# Patient Record
Sex: FEMALE | Race: WHITE | NOT HISPANIC OR LATINO | URBAN - METROPOLITAN AREA
[De-identification: names, ages, dates, MRNs, and addresses within clinical notes are randomized per-mention and may not be internally consistent; named-entity substitution may affect disease eponyms.]

---

## 2022-07-15 ENCOUNTER — OFFICE VISIT (OUTPATIENT)
Dept: URBAN - METROPOLITAN AREA CLINIC 46 | Facility: CLINIC | Age: 27
End: 2022-07-15
Payer: COMMERCIAL

## 2022-07-15 ENCOUNTER — TELEPHONE ENCOUNTER (OUTPATIENT)
Dept: URBAN - METROPOLITAN AREA CLINIC 46 | Facility: CLINIC | Age: 27
End: 2022-07-15

## 2022-07-15 VITALS
TEMPERATURE: 98 F | WEIGHT: 155.4 LBS | BODY MASS INDEX: 27.54 KG/M2 | SYSTOLIC BLOOD PRESSURE: 109 MMHG | DIASTOLIC BLOOD PRESSURE: 61 MMHG | HEIGHT: 63 IN | HEART RATE: 90 BPM

## 2022-07-15 DIAGNOSIS — K59.01 SLOW TRANSIT CONSTIPATION: ICD-10-CM

## 2022-07-15 DIAGNOSIS — R14.0 BLOATING: ICD-10-CM

## 2022-07-15 PROCEDURE — 99204 OFFICE O/P NEW MOD 45 MIN: CPT | Performed by: INTERNAL MEDICINE

## 2022-07-15 RX ORDER — PRUCALOPRIDE 2 MG/1
1 TABLET TABLET, FILM COATED ORAL ONCE A DAY
Qty: 30 | Refills: 3 | OUTPATIENT
Start: 2022-07-15 | End: 2022-11-11

## 2022-07-15 RX ORDER — METHYLDOPA/HYDROCHLOROTHIAZIDE 250MG-15MG
AS DIRECTED TABLET ORAL
Status: ACTIVE | COMMUNITY

## 2022-07-15 RX ORDER — PLECANATIDE 3 MG/1
1 TABLET TABLET ORAL ONCE A DAY
Qty: 30 | Refills: 3 | OUTPATIENT
Start: 2022-07-15 | End: 2022-11-11

## 2022-07-15 NOTE — HPI-TODAY'S VISIT:
Pt presents with 1 year of progressive post prandial bloating. constipation, upper abdominal pain. BM every 1-2 days but hard. Trial of probiotics and no relief.  Bloating will all food types. Cramping mild and a few times a week.  States no issues in adolescents. No associated weight loss or gain. No change with diet modification to gluten free. Denies blood in the stool. No family hx of CRC or IBD. In past trial of Linzess but too strong.

## 2022-07-29 ENCOUNTER — TELEPHONE ENCOUNTER (OUTPATIENT)
Dept: URBAN - METROPOLITAN AREA CLINIC 46 | Facility: CLINIC | Age: 27
End: 2022-07-29

## 2022-08-22 ENCOUNTER — TELEPHONE ENCOUNTER (OUTPATIENT)
Dept: URBAN - METROPOLITAN AREA CLINIC 46 | Facility: CLINIC | Age: 27
End: 2022-08-22

## 2022-09-13 ENCOUNTER — LAB OUTSIDE AN ENCOUNTER (OUTPATIENT)
Dept: URBAN - METROPOLITAN AREA CLINIC 44 | Facility: CLINIC | Age: 27
End: 2022-09-13

## 2022-09-14 ENCOUNTER — DASHBOARD ENCOUNTERS (OUTPATIENT)
Age: 27
End: 2022-09-14

## 2022-09-14 LAB
A/G RATIO: 2.2
ABSOLUTE BASOPHILS: 38
ABSOLUTE EOSINOPHILS: 143
ABSOLUTE LYMPHOCYTES: 1875
ABSOLUTE MONOCYTES: 510
ABSOLUTE NEUTROPHILS: 4935
ALBUMIN: 4.6
ALKALINE PHOSPHATASE: 36
ALT (SGPT): 17
AST (SGOT): 14
BASOPHILS: 0.5
BILIRUBIN, TOTAL: 0.5
BUN/CREATININE RATIO: (no result)
BUN: 13
CALCIUM: 9.3
CARBON DIOXIDE, TOTAL: 23
CHLORIDE: 107
CREATININE: 0.67
EGFR: 124
EOSINOPHILS: 1.9
GLOBULIN, TOTAL: 2.1
GLUCOSE: 82
HEMATOCRIT: 39.5
HEMOGLOBIN: 13.2
IMMUNOGLOBULIN A, QN, SERUM: 104
INTERPRETATION: (no result)
LYMPHOCYTES: 25
MCH: 31.4
MCHC: 33.4
MCV: 94
MONOCYTES: 6.8
MPV: 9.1
NEUTROPHILS: 65.8
PLATELET COUNT: 276
POTASSIUM: 4.2
PROTEIN, TOTAL: 6.7
RDW: 11.4
RED BLOOD CELL COUNT: 4.2
SODIUM: 140
T-TRANSGLUTAMINASE (TTG) IGA: <1
TSH W/REFLEX TO FT4: 0.58
WHITE BLOOD CELL COUNT: 7.5

## 2022-09-15 ENCOUNTER — OFFICE VISIT (OUTPATIENT)
Dept: URBAN - METROPOLITAN AREA CLINIC 46 | Facility: CLINIC | Age: 27
End: 2022-09-15
Payer: COMMERCIAL

## 2022-09-15 VITALS
HEIGHT: 63 IN | BODY MASS INDEX: 28.1 KG/M2 | DIASTOLIC BLOOD PRESSURE: 56 MMHG | HEART RATE: 89 BPM | TEMPERATURE: 98.5 F | WEIGHT: 158.6 LBS | SYSTOLIC BLOOD PRESSURE: 115 MMHG

## 2022-09-15 DIAGNOSIS — R14.0 BLOATING: ICD-10-CM

## 2022-09-15 DIAGNOSIS — K59.01 SLOW TRANSIT CONSTIPATION: ICD-10-CM

## 2022-09-15 PROBLEM — 35298007: Status: ACTIVE | Noted: 2022-07-15

## 2022-09-15 PROCEDURE — 99213 OFFICE O/P EST LOW 20 MIN: CPT | Performed by: INTERNAL MEDICINE

## 2022-09-15 NOTE — HPI-TODAY'S VISIT:
7/2022: Pt presents with 1 year of progressive post prandial bloating. constipation, upper abdominal pain. BM every 1-2 days but hard. Trial of probiotics and no relief.  Bloating will all food types. Cramping mild and a few times a week.  States no issues in adolescents. No associated weight loss or gain. No change with diet modification to gluten free. Denies blood in the stool. No family hx of CRC or IBD. In past trial of Linzess but too strong.    Sucrase breath test ordered and boderline low. Samples of stimuilant laxatives but all needed PA and pt states constipation less of an issue. CBC, CMP, TSH. Celiac panel all wnl.  9/15/2022: Now for a follow up: states bloating and pain mildy improved on gluten light diet (still eating at times) but symptoms not resolved. No new symptoms. Discussed Sucrase trial and wants to try.

## 2022-10-19 ENCOUNTER — TELEPHONE ENCOUNTER (OUTPATIENT)
Dept: URBAN - METROPOLITAN AREA CLINIC 46 | Facility: CLINIC | Age: 27
End: 2022-10-19

## 2022-12-16 ENCOUNTER — OFFICE VISIT (OUTPATIENT)
Dept: URBAN - METROPOLITAN AREA CLINIC 46 | Facility: CLINIC | Age: 27
End: 2022-12-16

## 2022-12-20 ENCOUNTER — OFFICE VISIT (OUTPATIENT)
Dept: URBAN - METROPOLITAN AREA CLINIC 44 | Facility: CLINIC | Age: 27
End: 2022-12-20